# Patient Record
Sex: MALE | Race: WHITE | NOT HISPANIC OR LATINO | Employment: FULL TIME | URBAN - METROPOLITAN AREA
[De-identification: names, ages, dates, MRNs, and addresses within clinical notes are randomized per-mention and may not be internally consistent; named-entity substitution may affect disease eponyms.]

---

## 2024-11-02 ENCOUNTER — OFFICE VISIT (OUTPATIENT)
Age: 39
End: 2024-11-02
Payer: COMMERCIAL

## 2024-11-02 ENCOUNTER — APPOINTMENT (OUTPATIENT)
Age: 39
End: 2024-11-02
Payer: COMMERCIAL

## 2024-11-02 VITALS
WEIGHT: 183 LBS | TEMPERATURE: 97.3 F | RESPIRATION RATE: 18 BRPM | HEIGHT: 66 IN | OXYGEN SATURATION: 98 % | HEART RATE: 73 BPM | SYSTOLIC BLOOD PRESSURE: 130 MMHG | BODY MASS INDEX: 29.41 KG/M2 | DIASTOLIC BLOOD PRESSURE: 80 MMHG

## 2024-11-02 DIAGNOSIS — S60.352A FOREIGN BODY OF LEFT THUMB, INITIAL ENCOUNTER: Primary | ICD-10-CM

## 2024-11-02 DIAGNOSIS — S60.352A FOREIGN BODY OF LEFT THUMB, INITIAL ENCOUNTER: ICD-10-CM

## 2024-11-02 DIAGNOSIS — Z23 ENCOUNTER FOR IMMUNIZATION: ICD-10-CM

## 2024-11-02 PROCEDURE — 90715 TDAP VACCINE 7 YRS/> IM: CPT

## 2024-11-02 PROCEDURE — G0382 LEV 3 HOSP TYPE B ED VISIT: HCPCS

## 2024-11-02 PROCEDURE — 10120 INC&RMVL FB SUBQ TISS SMPL: CPT

## 2024-11-02 PROCEDURE — 90471 IMMUNIZATION ADMIN: CPT

## 2024-11-02 PROCEDURE — 73140 X-RAY EXAM OF FINGER(S): CPT

## 2024-11-02 RX ORDER — CEPHALEXIN 500 MG/1
500 CAPSULE ORAL EVERY 6 HOURS SCHEDULED
Qty: 20 CAPSULE | Refills: 0 | Status: SHIPPED | OUTPATIENT
Start: 2024-11-02 | End: 2024-11-07

## 2024-11-02 NOTE — PROGRESS NOTES
[facility]        NAME: Dougie Marc is a 39 y.o. male  : 1985    MRN: [id number]  DATE: 2024  TIME: 9:26 AM    Assessment and Plan   Foreign body of left thumb, initial encounter [S60.352A]  1. Foreign body of left thumb, initial encounter  XR thumb left first digit-min 2v    Tdap Vaccine greater than or equal to 8yo    Ambulatory Referral to Orthopedic Surgery    cephalexin (KEFLEX) 500 mg capsule      2. Encounter for immunization  Tdap Vaccine greater than or equal to 8yo          TDap last dose 2016- updated today  Patient Instructions   Preliminary reading of left thumb X-ray: no foreign body visualized however we had discussed that wood might not appear on Xrays.  Radiologist will have final reading- if that is different I will call you.    I had made an attempt to remove the foreign body that you are feeling in your thumb but was unsuccessful Recommend you follow up with Hand surgery- Call [phone] to make an appointment    If tests have been performed at [facility], our office will contact you with results if changes need to be made to the care plan discussed with you at the visit.  [facility]    Chief Complaint     Chief Complaint   Patient presents with    Foreign Body in Skin     Fell on Monday and went to brace himself with left hand, resulting in splinter in left thumb pad from non pressure treated 2x4. Thumb pad is red. Pt feels resistance when he tries to bend his thumb and believes the splinter is deep and limiting his ability to bend thumb normally. Denies drainage. No part of splinter is present but entrance hole is present.          History of Present Illness       Patient reports he fell and as he was bracing himself, piece of wood went into the padding side of his left big thumb about 5 days ago.  Since then he feels there is a foreign body that is keeping him from bending his left thumb.  There is no drainage from  "the area but there is some swelling and also what appears to be 2 puncture points.    Foreign Body in Skin  Associated symptoms include joint swelling and myalgias. Pertinent negatives include no abdominal pain, arthralgias, chest pain, chills, coughing, fever, rash, vomiting or weakness.       Review of Systems   Review of Systems   Constitutional:  Negative for chills and fever.   Eyes:  Negative for pain and visual disturbance.   Respiratory:  Negative for cough and shortness of breath.    Cardiovascular:  Negative for chest pain and palpitations.   Gastrointestinal:  Negative for abdominal pain and vomiting.   Musculoskeletal:  Positive for joint swelling and myalgias. Negative for arthralgias and back pain.   Skin:  Positive for wound. Negative for color change and rash.   Neurological:  Negative for dizziness, seizures, syncope, weakness and light-headedness.   All other systems reviewed and are negative.        Current Medications       Current Outpatient Medications:     cephalexin (KEFLEX) 500 mg capsule, Take 1 capsule (500 mg total) by mouth every 6 (six) hours for 5 days, Disp: 20 capsule, Rfl: 0    Current Allergies     Allergies as of 11/02/2024    (No Known Allergies)            The following portions of the patient's history were reviewed and updated as appropriate: allergies, current medications, past family history, past medical history, past social history, past surgical history and problem list.     History reviewed. No pertinent past medical history.    Past Surgical History:   Procedure Laterality Date    DENTAL IMPLANT         Family History   Problem Relation Age of Onset    No Known Problems Mother     Cancer Father         thyroid, prostate, bladder         Medications have been verified.        Objective   /80   Pulse 73   Temp (!) 97.3 °F (36.3 °C)   Resp 18   Ht 5' 6\" (1.676 m)   Wt 83 kg (183 lb)   SpO2 98%   BMI 29.54 kg/m²   No LMP for male patient.       Physical Exam " "    Physical Exam  Vitals and nursing note reviewed.   Constitutional:       Appearance: Normal appearance.   HENT:      Head: Normocephalic and atraumatic.   Pulmonary:      Effort: Pulmonary effort is normal.   Musculoskeletal:        Hands:    Skin:     General: Skin is warm and dry.      Capillary Refill: Capillary refill takes less than 2 seconds.   Neurological:      General: No focal deficit present.      Mental Status: He is alert and oriented to person, place, and time. Mental status is at baseline.      Sensory: No sensory deficit.      Motor: No weakness.   Psychiatric:         Mood and Affect: Mood normal.         Behavior: Behavior normal.         Thought Content: Thought content normal.     Universal Protocol:  Consent: Verbal consent obtained.  Risks and benefits: risks, benefits and alternatives were discussed  Consent given by: patient  Time out: Immediately prior to procedure a \"time out\" was called to verify the correct patient, procedure, equipment, support staff and site/side marked as required.  Patient understanding: patient states understanding of the procedure being performed  Foreign body removal    Date/Time: 11/2/2024 8:30 AM    Performed by: MARIE Polo  Authorized by: MARIE Polo  Body area: skin  General location: upper extremity  Location details: left thumb  Anesthesia: local infiltration    Anesthesia:  Local Anesthetic: lidocaine 1% without epinephrine  Anesthetic total: 0.5 mL  Patient restrained: no  Patient cooperative: yes  Localization method: probed  Removal mechanism: alligator forceps and forceps  Dressing: antibiotic ointment and dressing applied  Tendon involvement: none  Depth: subcutaneous  0 objects recovered.  Comments: A small incision was made around insertion point of the foreign body. Attempted to probe for foreign body but none that was located.                    "

## 2024-11-02 NOTE — PATIENT INSTRUCTIONS
Preliminary reading of left thumb X-ray: no foreign body visualized however we had discussed that wood might not appear on Xrays.  Radiologist will have final reading- if that is different I will call you.    I had made an attempt to remove the foreign body that you are feeling in your thumb but was unsuccessful Recommend you follow up with Hand surgery- Call [phone] to make an appointment    If tests have been performed at [facility], our office will contact you with results if changes need to be made to the care plan discussed with you at the visit.  You can review your full results on [facility].

## 2024-11-05 VITALS
SYSTOLIC BLOOD PRESSURE: 135 MMHG | BODY MASS INDEX: 29.41 KG/M2 | DIASTOLIC BLOOD PRESSURE: 70 MMHG | WEIGHT: 183 LBS | HEIGHT: 66 IN

## 2024-11-05 DIAGNOSIS — S60.352A FOREIGN BODY OF LEFT THUMB, INITIAL ENCOUNTER: ICD-10-CM

## 2024-11-05 PROCEDURE — 10120 INC&RMVL FB SUBQ TISS SMPL: CPT | Performed by: SURGERY

## 2024-11-05 PROCEDURE — 99203 OFFICE O/P NEW LOW 30 MIN: CPT | Performed by: SURGERY

## 2024-11-05 RX ORDER — LIDOCAINE HYDROCHLORIDE 10 MG/ML
4 INJECTION, SOLUTION INFILTRATION; PERINEURAL
Status: COMPLETED | OUTPATIENT
Start: 2024-11-05 | End: 2024-11-05

## 2024-11-05 RX ADMIN — LIDOCAINE HYDROCHLORIDE 4 ML: 10 INJECTION, SOLUTION INFILTRATION; PERINEURAL at 08:15

## 2024-11-05 NOTE — PROGRESS NOTES
ORTHOPAEDIC HAND, WRIST, AND ELBOW OFFICE  VISIT       ASSESSMENT/PLAN:      39 y.o. year old male who presents with left thumb foreign body    Physical exam was performed  We discussed exploration of the thumb may not remove the object. Patient elected to attempt  Foreign body removal performed. 8 mm sliver of wood like material removed.  See procedure notes below.  May remove dressing tomorrow and wash normally.  Band aids as needed for the next few days after dressing removal.    Cont with antibiotics as prescribed  NSAID's as needed  Activities as tolerated    The patient verbalized understanding of exam findings and treatment plan. We engaged in the shared decision-making process and treatment options were discussed at length with the patient. Surgical and conservative management discussed today along with risks and benefits.    Diagnoses and all orders for this visit:    Foreign body of left thumb, initial encounter  -     Ambulatory Referral to Orthopedic Surgery  -     Hand/upper extremity injection  -     Foreign body removal    Other orders  -     Cancel: Incision and drain        Follow Up:  Return if symptoms worsen or fail to improve.        ____________________________________________________________________________________________________________________________________________      CHIEF COMPLAINT:  Chief Complaint   Patient presents with    Left Hand - Pain       SUBJECTIVE:  Dougie Marc is a 39 y.o. year old  male who presents for evaluation     A week ago patient states that he tripped and fell and went to grab a 2x4 and a splint was lodged into his left thumb. He knew a splinter was in the thumb volar pad but there was nothing to remove. He has been wrapping the thumb to prevent the thumb from moving as this is painful  He went to the  were they attempted to remove the object but none was found  [facility] note was reviewed  He still has pain with flexion of the IP joint and there is tenderness at  "the IP joint so he is not bending the thumb. He can feel the objected poking the skin when he tries to bend the thumb  He would like to have the object removed  He is currently taking his antibiotics    I have personally reviewed all the relevant PMH, PSH, SH, FH, Medications and allergies      PAST MEDICAL HISTORY:  No past medical history on file.    PAST SURGICAL HISTORY:  Past Surgical History:   Procedure Laterality Date    DENTAL IMPLANT         FAMILY HISTORY:  Family History   Problem Relation Age of Onset    No Known Problems Mother     Cancer Father         thyroid, prostate, bladder       SOCIAL HISTORY:  Social History     Tobacco Use    Smoking status: Never    Smokeless tobacco: Never   Vaping Use    Vaping status: Never Used   Substance Use Topics    Alcohol use: Not Currently    Drug use: Never       MEDICATIONS:    Current Outpatient Medications:     cephalexin (KEFLEX) 500 mg capsule, Take 1 capsule (500 mg total) by mouth every 6 (six) hours for 5 days, Disp: 20 capsule, Rfl: 0    ALLERGIES:  No Known Allergies        REVIEW OF SYSTEMS:  Review of Systems   Constitutional:  Negative for chills and fever.   HENT:  Negative for ear pain and sore throat.    Eyes:  Negative for pain and visual disturbance.   Respiratory:  Negative for cough and shortness of breath.    Cardiovascular:  Negative for chest pain and palpitations.   Gastrointestinal:  Negative for abdominal pain and vomiting.   Genitourinary:  Negative for dysuria and hematuria.   Musculoskeletal:  Negative for arthralgias and back pain.   Skin:  Negative for color change and rash.   Neurological:  Negative for seizures and syncope.   All other systems reviewed and are negative.      VITALS:  Vitals:    11/05/24 0803   BP: 135/70       LABS:  HgA1c: No results found for: \"HGBA1C\"  BMP:   Lab Results   Component Value Date    CALCIUM 9.0 07/11/2020    K 4.5 07/11/2020    CO2 26 07/11/2020     07/11/2020    BUN 16 07/11/2020    " CREATININE 1.03 07/11/2020       _____________________________________________________  PHYSICAL EXAMINATION:  General: well developed and well nourished, alert, oriented times 3, and appears comfortable  Psychiatric: Normal  HEENT: Normocephalic, Atraumatic Trachea Midline, No torticollis  Pulmonary: No audible wheezing or respiratory distress   Abdomen/GI: Non tender, non distended   Cardiovascular: No pitting edema, 2+ radial pulse   Skin: No masses, erythema, lacerations, fluctation, ulcerations  Neurovascular: Sensation Intact to the Median, Ulnar, Radial Nerve, Motor Intact to the Median, Ulnar, Radial Nerve, and Pulses Intact  Musculoskeletal: Normal, except as noted in detailed exam and in HPI.      MUSCULOSKELETAL EXAMINATION:  Right hand:  SILT  Composite fist    Left hand:  SILT  Composite fist NT    Healing puncture wound volar thumb  Healing incision volar thumb with peeling skin  Mild swelling  No erythema  No signs of infection    ___________________________________________________  STUDIES REVIEWED:  I have personally reviewed AP lateral and oblique radiographs of Left thumb   which demonstrate      FINDINGS:     No acute fracture or dislocation.     No significant degenerative changes.     No lytic or blastic osseous lesion.     Unremarkable soft tissues. No soft tissue gas. No foreign bodies are visible. If there is high clinical concern for a non-radiopaque foreign body such as a wooden splinter, a targeted ultrasound of the area may provide further diagnostic information..     IMPRESSION:     No abnormality visible. If there is high clinical concern for a foreign body which is not visible on x-ray, an ultrasound of the area might be helpful.          PROCEDURES PERFORMED:  Hand/upper extremity injection  Universal Protocol:  Consent: Verbal consent obtained.  Risks and benefits: risks, benefits and alternatives were discussed  Consent given by: patient  Timeout called at: 11/5/2024 8:28  AM.  Patient understanding: patient states understanding of the procedure being performed  Patient identity confirmed: verbally with patient  Procedure Details  Needle size: 22 G  Ultrasound guidance: no  Approach: dorsal  Medications administered: 4 mL lidocaine 1 %  Patient tolerance: patient tolerated the procedure well with no immediate complications       Universal Protocol:  Consent: Verbal consent obtained.  Consent given by: patient  Timeout called at: 11/5/2024 8:33 AM.  Patient understanding: patient states understanding of the procedure being performed  Patient identity confirmed: verbally with patient  Foreign body removal    Date/Time: 11/5/2024 8:15 AM    Performed by: Curt White MD  Authorized by: Curt White MD  Intake: Left volar thumb.  Anesthesia: local infiltration    Anesthesia:  Local Anesthetic: lidocaine 1% without epinephrine  Anesthetic total: 10 mL    Sedation:  Patient sedated: no  Patient restrained: no  Patient cooperative: yes  Complexity: simple  1 objects recovered.  Objects recovered: wood  Post-procedure assessment: foreign body removed  Patient tolerance: patient tolerated the procedure well with no immediate complications          _____________________________________________________      Scribe Attestation      I,:  Dylan Nelson am acting as a scribe while in the presence of the attending physician.:       I,:  Curt White MD personally performed the services described in this documentation    as scribed in my presence.:

## 2024-12-06 ENCOUNTER — OFFICE VISIT (OUTPATIENT)
Age: 39
End: 2024-12-06
Payer: COMMERCIAL

## 2024-12-06 VITALS
WEIGHT: 183 LBS | SYSTOLIC BLOOD PRESSURE: 124 MMHG | DIASTOLIC BLOOD PRESSURE: 60 MMHG | HEART RATE: 73 BPM | TEMPERATURE: 97 F | OXYGEN SATURATION: 99 % | RESPIRATION RATE: 18 BRPM | HEIGHT: 66 IN | BODY MASS INDEX: 29.41 KG/M2

## 2024-12-06 DIAGNOSIS — J06.9 VIRAL URI: Primary | ICD-10-CM

## 2024-12-06 LAB — S PYO AG THROAT QL: NEGATIVE

## 2024-12-06 PROCEDURE — G0382 LEV 3 HOSP TYPE B ED VISIT: HCPCS | Performed by: EMERGENCY MEDICINE

## 2024-12-06 PROCEDURE — 87880 STREP A ASSAY W/OPTIC: CPT | Performed by: EMERGENCY MEDICINE

## 2024-12-06 RX ORDER — PREDNISONE 10 MG/1
TABLET ORAL
Qty: 21 TABLET | Refills: 0 | Status: SHIPPED | OUTPATIENT
Start: 2024-12-06

## 2024-12-06 NOTE — PATIENT INSTRUCTIONS
You have been diagnosed with a Viral Upper Respiratory infection and your symptoms should resolve over the next 7 to 10 days with the treatments recommended today.  If they do not, it is possible that you have developed a bacterial infection and you should return. If you were to take an antibiotic while you are still in the viral stage, you will not get better any faster, but could kill off many of the good germs in your body as well as make the germs in you resistant to the antibiotic.  Take an expectorant - guaifenesin should be the only ingredient - during the day, and a cough suppressant (ex. Robitussin DM or Tessalon) if needed at night only.   Take Zinc 25 mg every 12 hours for the next week (the dose is important so do not just take a multivitamin with zinc or an over-the-counter cold med with zinc such as Airborne or Zicam, as that will not give you the sufficient dose).    You should also take Vitamin D3 5000 i.u.s per day for the next 1 week, and Vitamin-C 1 g twice daily (and again dosages are important, do not think you are getting enough vitamin C just by drinking extra orange juice).  You may use Flonase as discussed.  You may also take a decongestant like Sudafed, unless you have hypertension or cardiac disease. Avoid nasal sprays like Afrin or other vasoconstrictors.  If you are diabetic you should adhere strictly to your diabetic diet and monitor blood sugar closely while on prednisone and you should discontinue the prednisone if blood sugar becomes significantly elevated.  Avoid nonsteroidal anti-inflammatories like Advil or Aleve while on prednisone.   Although bothersome, mucous is not necessarily a bad thing. Production of mucous is the body's way of trying to capture and flush irritants from mucosal surfaces. Yellow or green mucous does not necessarily mean you have a bacterial infection. Mucous will become more discolored over time, especially first thing in the morning, as your body's  immune system floods the mucosal surfaces with white bloods cells to try and help fight infection. This white blood cell debride can also cause mucous to be discolored. Again, using nasal saline spray frequently may help soothe and keep mucous flowing out versus getting dried, thickened and / or stuck leading to more sinus pain and pressure.      If you have a cough, please realize that a cough is not necessarily a bad thing. It is a part of your body's protection mechanism to help keep your airways clear. Phlegm may be more discolored in the morning. Please note that discolored phlegm does not necessarily mean a bacterial infection.      Upper Respiratory Infection   AMBULATORY CARE:   An upper respiratory infection  is also called a cold. Your nose, throat, ears, and sinuses may be affected. You are more likely to get a cold in the winter. Your risk of getting a cold may be increased if you smoke cigarettes or have allergies, such as hay fever.  What causes a cold?  A cold is caused by a virus. Many viruses can cause a cold, and each is contagious. This means the virus can be easily spread to another person when the sick person coughs or sneezes. The virus can also be spread if you touch an object the virus is on and then touch your eyes, mouth, or nose.  Cold symptoms  are usually worst for the first 3 to 5 days. You may have any of the following:  Runny or stuffy nose    Sneezing and coughing    Sore throat or hoarseness    Red, watery, and sore eyes    Fatigue (you feel more tired than usual)    Chills and fever    Headache, body aches, or sore muscles    Call your local emergency number (911 in the [place]) if:   You have chest pain or trouble breathing.      Seek care immediately if:   You have a fever over 102ºF (39ºC).      Call your doctor if:   You have a low fever.    Your sore throat gets worse or you see white or yellow spots in your throat.    Your symptoms get worse after 3 to 5 days or are not better in  14 days.    You have a rash anywhere on your skin.    You have large, tender lumps in your neck.    You have thick, green, or yellow drainage from your nose.    You cough up thick yellow, green, or bloody mucus.    You have a bad earache.    You have questions or concerns about your condition or care.    Treatment:  Colds are caused by viruses and do not get better with antibiotics. Most people get better in 7 to 14 days. You may continue to cough for 2 to 3 weeks. The following may help decrease your symptoms:  Decongestants  help reduce nasal congestion and help you breathe more easily. If you take decongestant pills, they may make you feel restless or not able to sleep. Do not use decongestant sprays for more than a few days.    Cough suppressants  help reduce coughing. Ask your healthcare provider which type of cough medicine is best for you.     NSAIDs , such as ibuprofen, help decrease swelling, pain, and fever. NSAIDs can cause stomach bleeding or kidney problems in certain people. If you take blood thinner medicine, always ask your healthcare provider if NSAIDs are safe for you. Always read the medicine label and follow directions.    Acetaminophen  decreases pain and fever. It is available without a doctor's order. Ask how much to take and how often to take it. Follow directions. Read the labels of all other medicines you are using to see if they also contain acetaminophen, or ask your doctor or pharmacist. Acetaminophen can cause liver damage if not taken correctly. Do not use more than 4 grams (4,000 milligrams) total of acetaminophen in one day.    Manage a cold:   Rest as much as possible.  Slowly start to do more each day.    Drink more liquids as directed.  Liquids will help thin and loosen mucus so you can cough it up. Liquids will also help prevent dehydration. Liquids that help prevent dehydration include water, fruit juice, and broth. Do not drink liquids that contain caffeine. Caffeine can  increase your risk for dehydration. Ask your healthcare provider how much liquid to drink each day.    Soothe a sore throat.  Gargle with warm salt water. Make salt water by dissolving ¼ teaspoon salt in 1 cup warm water. You may also suck on hard candy or throat lozenges. You may use a sore throat spray.    Use a humidifier or vaporizer.  Use a cool mist humidifier or a vaporizer to increase air moisture in your home. This may make it easier for you to breathe and help decrease your cough.    Use saline nasal drops as directed.  These help relieve congestion.    Apply petroleum-based jelly around the outside of your nostrils.  This can decrease irritation from blowing your nose.    Do not smoke.  Nicotine and other chemicals in cigarettes and cigars can make your symptoms worse. They can also cause infections such as bronchitis or pneumonia. Ask your healthcare provider for information if you currently smoke and need help to quit. E-cigarettes or smokeless tobacco still contain nicotine. Talk to your healthcare provider before you use these products.    Prevent a cold:   Wash your hands often.  Use soap and water every time you wash your hands. Rub your soapy hands together, lacing your fingers. Use the fingers of one hand to scrub under the nails of the other hand. Wash for at least 20 seconds. Rinse with warm, running water for several seconds. Then dry your hands. Use germ-killing gel if soap and water are not available. Do not touch your eyes or mouth without washing your hands first.     Cover a sneeze or cough.  Use a tissue that covers your mouth and nose. Put the used tissue in the trash right away. Use the bend of your arm if a tissue is not available. Wash your hands well with soap and water or use a hand . Do not stand close to anyone who is sneezing or coughing.    Try to stay away from others while you are sick.  This is especially important during the first 2 to 3 days when the virus is more  easily spread. Wait until a fever, cough, or other symptoms are gone before you return to work or other regular activities.    Do not share items while you are sick.  This includes food, drinks, eating utensils, and dishes.    Follow up with your doctor as directed:  Write down your questions so you remember to ask them during your visits.  © Copyright [employer] 2022 Information is for End User's use only and may not be sold, redistributed or otherwise used for commercial purposes. All illustrations and images included in CareNotes® are the copyrighted property of [employer]. or [employer]  The above information is an  only. It is not intended as medical advice for individual conditions or treatments. Talk to your doctor, nurse or pharmacist before following any medical regimen to see if it is safe and effective for you.

## 2024-12-06 NOTE — PROGRESS NOTES
[facility]        NAME: Dougie Marc is a 39 y.o. male  : 1985    MRN: [id number]  DATE: 2024  TIME: 9:16 AM    Assessment and Plan   Viral URI [J06.9]  1. Viral URI  POCT rapid strepA    predniSONE 10 mg tablet            Patient Instructions     Patient Instructions     You have been diagnosed with a Viral Upper Respiratory infection and your symptoms should resolve over the next 7 to 10 days with the treatments recommended today.  If they do not, it is possible that you have developed a bacterial infection and you should return. If you were to take an antibiotic while you are still in the viral stage, you will not get better any faster, but could kill off many of the good germs in your body as well as make the germs in you resistant to the antibiotic.  Take an expectorant - guaifenesin should be the only ingredient - during the day, and a cough suppressant (ex. Robitussin DM or Tessalon) if needed at night only.   Take Zinc 25 mg every 12 hours for the next week (the dose is important so do not just take a multivitamin with zinc or an over-the-counter cold med with zinc such as Airborne or Zicam, as that will not give you the sufficient dose).    You should also take Vitamin D3 5000 i.u.s per day for the next 1 week, and Vitamin-C 1 g twice daily (and again dosages are important, do not think you are getting enough vitamin C just by drinking extra orange juice).  You may use Flonase as discussed.  You may also take a decongestant like Sudafed, unless you have hypertension or cardiac disease. Avoid nasal sprays like Afrin or other vasoconstrictors.  If you are diabetic you should adhere strictly to your diabetic diet and monitor blood sugar closely while on prednisone and you should discontinue the prednisone if blood sugar becomes significantly elevated.  Avoid nonsteroidal anti-inflammatories like Advil or Aleve while on prednisone.   Although bothersome, mucous is not necessarily  a bad thing. Production of mucous is the body's way of trying to capture and flush irritants from mucosal surfaces. Yellow or green mucous does not necessarily mean you have a bacterial infection. Mucous will become more discolored over time, especially first thing in the morning, as your body's immune system floods the mucosal surfaces with white bloods cells to try and help fight infection. This white blood cell debride can also cause mucous to be discolored. Again, using nasal saline spray frequently may help soothe and keep mucous flowing out versus getting dried, thickened and / or stuck leading to more sinus pain and pressure.      If you have a cough, please realize that a cough is not necessarily a bad thing. It is a part of your body's protection mechanism to help keep your airways clear. Phlegm may be more discolored in the morning. Please note that discolored phlegm does not necessarily mean a bacterial infection.      Upper Respiratory Infection   AMBULATORY CARE:   An upper respiratory infection  is also called a cold. Your nose, throat, ears, and sinuses may be affected. You are more likely to get a cold in the winter. Your risk of getting a cold may be increased if you smoke cigarettes or have allergies, such as hay fever.  What causes a cold?  A cold is caused by a virus. Many viruses can cause a cold, and each is contagious. This means the virus can be easily spread to another person when the sick person coughs or sneezes. The virus can also be spread if you touch an object the virus is on and then touch your eyes, mouth, or nose.  Cold symptoms  are usually worst for the first 3 to 5 days. You may have any of the following:  Runny or stuffy nose    Sneezing and coughing    Sore throat or hoarseness    Red, watery, and sore eyes    Fatigue (you feel more tired than usual)    Chills and fever    Headache, body aches, or sore muscles    Call your local emergency number (911 in the [place]) if:   You have  chest pain or trouble breathing.      Seek care immediately if:   You have a fever over 102ºF (39ºC).      Call your doctor if:   You have a low fever.    Your sore throat gets worse or you see white or yellow spots in your throat.    Your symptoms get worse after 3 to 5 days or are not better in 14 days.    You have a rash anywhere on your skin.    You have large, tender lumps in your neck.    You have thick, green, or yellow drainage from your nose.    You cough up thick yellow, green, or bloody mucus.    You have a bad earache.    You have questions or concerns about your condition or care.    Treatment:  Colds are caused by viruses and do not get better with antibiotics. Most people get better in 7 to 14 days. You may continue to cough for 2 to 3 weeks. The following may help decrease your symptoms:  Decongestants  help reduce nasal congestion and help you breathe more easily. If you take decongestant pills, they may make you feel restless or not able to sleep. Do not use decongestant sprays for more than a few days.    Cough suppressants  help reduce coughing. Ask your healthcare provider which type of cough medicine is best for you.     NSAIDs , such as ibuprofen, help decrease swelling, pain, and fever. NSAIDs can cause stomach bleeding or kidney problems in certain people. If you take blood thinner medicine, always ask your healthcare provider if NSAIDs are safe for you. Always read the medicine label and follow directions.    Acetaminophen  decreases pain and fever. It is available without a doctor's order. Ask how much to take and how often to take it. Follow directions. Read the labels of all other medicines you are using to see if they also contain acetaminophen, or ask your doctor or pharmacist. Acetaminophen can cause liver damage if not taken correctly. Do not use more than 4 grams (4,000 milligrams) total of acetaminophen in one day.    Manage a cold:   Rest as much as possible.  Slowly start to do  more each day.    Drink more liquids as directed.  Liquids will help thin and loosen mucus so you can cough it up. Liquids will also help prevent dehydration. Liquids that help prevent dehydration include water, fruit juice, and broth. Do not drink liquids that contain caffeine. Caffeine can increase your risk for dehydration. Ask your healthcare provider how much liquid to drink each day.    Soothe a sore throat.  Gargle with warm salt water. Make salt water by dissolving ¼ teaspoon salt in 1 cup warm water. You may also suck on hard candy or throat lozenges. You may use a sore throat spray.    Use a humidifier or vaporizer.  Use a cool mist humidifier or a vaporizer to increase air moisture in your home. This may make it easier for you to breathe and help decrease your cough.    Use saline nasal drops as directed.  These help relieve congestion.    Apply petroleum-based jelly around the outside of your nostrils.  This can decrease irritation from blowing your nose.    Do not smoke.  Nicotine and other chemicals in cigarettes and cigars can make your symptoms worse. They can also cause infections such as bronchitis or pneumonia. Ask your healthcare provider for information if you currently smoke and need help to quit. E-cigarettes or smokeless tobacco still contain nicotine. Talk to your healthcare provider before you use these products.    Prevent a cold:   Wash your hands often.  Use soap and water every time you wash your hands. Rub your soapy hands together, lacing your fingers. Use the fingers of one hand to scrub under the nails of the other hand. Wash for at least 20 seconds. Rinse with warm, running water for several seconds. Then dry your hands. Use germ-killing gel if soap and water are not available. Do not touch your eyes or mouth without washing your hands first.     Cover a sneeze or cough.  Use a tissue that covers your mouth and nose. Put the used tissue in the trash right away. Use the bend of your  arm if a tissue is not available. Wash your hands well with soap and water or use a hand . Do not stand close to anyone who is sneezing or coughing.    Try to stay away from others while you are sick.  This is especially important during the first 2 to 3 days when the virus is more easily spread. Wait until a fever, cough, or other symptoms are gone before you return to work or other regular activities.    Do not share items while you are sick.  This includes food, drinks, eating utensils, and dishes.    Follow up with your doctor as directed:  Write down your questions so you remember to ask them during your visits.  © Copyright [employer] 2022 Information is for End User's use only and may not be sold, redistributed or otherwise used for commercial purposes. All illustrations and images included in CareNotes® are the copyrighted property of [employer]. or [employer]  The above information is an  only. It is not intended as medical advice for individual conditions or treatments. Talk to your doctor, nurse or pharmacist before following any medical regimen to see if it is safe and effective for you.          Follow up with PCP in 3-5 days.  Proceed to  ER if symptoms worsen.    Chief Complaint     Chief Complaint   Patient presents with    Sore Throat     C/o sore throat and slight cough x 5days         History of Present Illness       Complains of sore throat and congestion for the past 5 days    Sore Throat   Associated symptoms include congestion and coughing. Pertinent negatives include no diarrhea, ear discharge, headaches, shortness of breath or vomiting.       Review of Systems   Review of Systems   Constitutional:  Negative for chills and fever.   HENT:  Positive for congestion, rhinorrhea, sinus pressure and sore throat. Negative for ear discharge and hearing loss.    Respiratory:  Positive for cough. Negative for chest tightness, shortness of breath and wheezing.   "  Gastrointestinal:  Negative for diarrhea and vomiting.   Musculoskeletal:  Negative for neck stiffness.   Skin:  Negative for pallor.   Neurological:  Negative for headaches.         Current Medications       Current Outpatient Medications:     predniSONE 10 mg tablet, Take once daily all day's pills on this schedule  6- 5- 4- 3- 2- 1, Disp: 21 tablet, Rfl: 0    Current Allergies     Allergies as of 12/06/2024    (No Known Allergies)            The following portions of the patient's history were reviewed and updated as appropriate: allergies, current medications, past family history, past medical history, past social history, past surgical history and problem list.     History reviewed. No pertinent past medical history.    Past Surgical History:   Procedure Laterality Date    DENTAL IMPLANT         Family History   Problem Relation Age of Onset    No Known Problems Mother     Cancer Father         thyroid, prostate, bladder         Medications have been verified.        Objective   /60 (BP Location: Right arm, Patient Position: Sitting)   Pulse 73   Temp (!) 97 °F (36.1 °C)   Resp 18   Ht 5' 6\" (1.676 m)   Wt 83 kg (183 lb)   SpO2 99%   BMI 29.54 kg/m²        Physical Exam     Physical Exam  Vitals and nursing note reviewed.   Constitutional:       General: He is not in acute distress.     Appearance: Normal appearance. He is well-developed. He is not ill-appearing or diaphoretic.   HENT:      Head: Normocephalic and atraumatic.      Right Ear: Tympanic membrane, ear canal and external ear normal.      Left Ear: Tympanic membrane, ear canal and external ear normal.      Nose: Mucosal edema and congestion present. No rhinorrhea.      Mouth/Throat:      Pharynx: Posterior oropharyngeal erythema present.      Tonsils: No tonsillar exudate or tonsillar abscesses. 1+ on the right. 1+ on the left.   Eyes:      Conjunctiva/sclera: Conjunctivae normal.      Pupils: Pupils are equal, round, and reactive to " light.   Cardiovascular:      Rate and Rhythm: Normal rate and regular rhythm.      Heart sounds: Normal heart sounds.   Pulmonary:      Effort: Pulmonary effort is normal. No respiratory distress.      Breath sounds: Normal breath sounds. No wheezing, rhonchi or rales.   Musculoskeletal:      Cervical back: Neck supple.   Lymphadenopathy:      Cervical: No cervical adenopathy.   Skin:     General: Skin is warm and dry.      Coloration: Skin is not pale.      Findings: No rash.   Neurological:      Mental Status: He is alert and oriented to person, place, and time.   Psychiatric:         Mood and Affect: Mood normal.         Behavior: Behavior normal.         Thought Content: Thought content normal.         Judgment: Judgment normal.

## 2025-01-22 ENCOUNTER — OFFICE VISIT (OUTPATIENT)
Age: 40
End: 2025-01-22
Payer: COMMERCIAL

## 2025-01-22 VITALS
BODY MASS INDEX: 29.73 KG/M2 | HEART RATE: 78 BPM | WEIGHT: 185 LBS | OXYGEN SATURATION: 98 % | SYSTOLIC BLOOD PRESSURE: 136 MMHG | DIASTOLIC BLOOD PRESSURE: 88 MMHG | RESPIRATION RATE: 18 BRPM | TEMPERATURE: 96.3 F | HEIGHT: 66 IN

## 2025-01-22 DIAGNOSIS — F43.21 GRIEF REACTION: Primary | ICD-10-CM

## 2025-01-22 PROCEDURE — G0382 LEV 3 HOSP TYPE B ED VISIT: HCPCS | Performed by: EMERGENCY MEDICINE

## 2025-01-22 RX ORDER — ALPRAZOLAM 0.5 MG
0.5 TABLET ORAL 2 TIMES DAILY PRN
Qty: 10 TABLET | Refills: 0 | Status: SHIPPED | OUTPATIENT
Start: 2025-01-22

## 2025-01-22 NOTE — PATIENT INSTRUCTIONS
Patient Education     Dealing With Death, Adult   About this topic   Death happens to all living things at some point in time. Grief is a normal response to the loss of a family member, friend, or pet. It does not matter if the death was a result of a long illness or a sudden accident. The process of grieving may be very painful. It can affect your feelings and your view of life around you. You may feel the physical result of grief too. It may take a long time to work through your grief, before you feel like you have adjusted to normal life again. Losing someone is a sad thing and sadness is a big part of grief. It may seem hard to just go on with life having this grief, but there are healthy ways to cope with it.  General   People often talk about moving on after the loss of a loved one. Moving on does not mean to forget the loved one who . Some people feel to move shows a lack of love or respect for the one who . In fact, it just shows that the grief has run its course. Grief is a time-limited normal human response to loss. It will take time to heal. With time and support, you will find a new way to live without your loved one in your life.  There are healthy ways to cope with the sadness and grief.  Deal with your feelings:   There is no right or wrong way to grieve. It is only important that you do it without hurting yourself or others.  Know that it's okay to cry. This is a normal way to let go of strong feelings. Cry alone, cry with a counselor, cry with family or friends.  Be honest with yourself and admit your feelings. Try to accept that the loss happened. Recognize this is a hard time. Try not to pretend that all is well. This will let others to know their support is needed.  Talk about how you feel and what you think. Talking about what happened is a healthy way of trying to make sense of your loss. It will help you work toward accepting your life without your loved one.  Get support from others:    Remember, there are still other family members and friends who love you. Don't forget those who are living.   Get support from other family members and friends who love you. Tell people what you need or what may be helpful. Such as, if being in the house alone is hard, ask someone to spend the night or weekend. If cooking or eating alone is hard, ask for help with meals or go out to eat with friends. If going through clothing or personal items is too painful, ask a close friend to help.   Draw comfort from your ashley. You may find praying, meditating, or talking to a Alevism adviser helpful.  Talk to a therapist or grief counselor. This person can help you deal with the strong feelings that go along with grief and loss. Often, friends and family may want to help. They may not have the training to know how in a healthy way.  Join a support group. You will be able to share feelings and listen to others who have gone through their own loss.  Activities may be helpful:   Reading books on grief and loss may be helpful.  Do things that you enjoy and are fun. Some people enjoy painting or drawing. Others are drawn to singing, playing a musical instrument, or dancing. Working with your hands, gardening, or doing crafts may appeal to some people. It often feels good to do something to express strong feelings.  Spend time working with others. Join Judaism or community activities.  Keep a diary or scrapbook. This is one more way to express strong thoughts and feelings as you deal with grief. It also gives a way to look back as time goes on. You can see how the pain eases and life starts to move on once again.  Create a memorial. Having some lasting keepsake of a loved one may bring comfort to you. Plant a tree, prosper bush, or flowers. Make a garden spot or get a tattoo. Some families create scholarships or make donations to a favorite chari in a loved one's name.  Plan ahead for hard times:   Make plans on how to cope with  holidays or anniversaries. These can bring back memories and feelings. Be ready and expect these feelings. Talk to your family and friends as a way to give honor to your loved one.  Don't be afraid to ask for extra support during these times.  Avoid self-destructive ways of dealing with your grief.   Do not leave or avoid friends and family who love you.  Don't use alcohol or illegal or prescription drugs to numb strong feelings.  Avoid overeating or not eating enough.  Don't madrid or over spend to try and fill the emptiness of loss and grief.  Put off major life decisions for a while. If a major decision has to be made, talk with a close friend or counselor before making it. Get other views before deciding.  Don't take anger out on others, physically, emotionally, or verbally.  When do I need to call the doctor?   You have made any attempt to hurt yourself or if you have been doing very risky things with the hope of dying by accident  You feel life is not worth living or you have thoughts of harming yourself or someone else  You can’t sleep, eat, or think clearly  You long to die to be with your loved one  You blame yourself for the loss  You keep yourself away from other people  You show signs of not taking care of yourself. These can be not:   Eating  Showering  Talking to others or getting out of bed  Taking needed drugs  Going to appointments, work, or school  You can't trust other people or feel fearful of others  Your grief does not go away or seems to be getting worse  Last Reviewed Date   2021-07-09  Consumer Information Use and Disclaimer   This generalized information is a limited summary of diagnosis, treatment, and/or medication information. It is not meant to be comprehensive and should be used as a tool to help the user understand and/or assess potential diagnostic and treatment options. It does NOT include all information about conditions, treatments, medications, side effects, or risks that may  apply to a specific patient. It is not intended to be medical advice or a substitute for the medical advice, diagnosis, or treatment of a health care provider based on the health care provider's examination and assessment of a patient’s specific and unique circumstances. Patients must speak with a health care provider for complete information about their health, medical questions, and treatment options, including any risks or benefits regarding use of medications. This information does not endorse any treatments or medications as safe, effective, or approved for treating a specific patient. [employer] and its affiliates disclaim any warranty or liability relating to this information or the use thereof. The use of this information is governed by the Terms of Use, available at https://www.[employer].com/en/know/clinical-effectiveness-terms   Copyright   Copyright © 2024 [employer] and its affiliates and/or licensors. All rights reserved.

## 2025-01-22 NOTE — PROGRESS NOTES
[facility]        NAME: Dougie Marc is a 39 y.o. male  : 1985    MRN: [id number]  DATE: 2025  TIME: 10:19 AM    Assessment and Plan   Grief reaction [F43.21]  1. Grief reaction  ALPRAZolam (XANAX) 0.5 mg tablet            Patient Instructions     Patient Instructions   Patient Education     Dealing With Death, Adult   About this topic   Death happens to all living things at some point in time. Grief is a normal response to the loss of a family member, friend, or pet. It does not matter if the death was a result of a long illness or a sudden accident. The process of grieving may be very painful. It can affect your feelings and your view of life around you. You may feel the physical result of grief too. It may take a long time to work through your grief, before you feel like you have adjusted to normal life again. Losing someone is a sad thing and sadness is a big part of grief. It may seem hard to just go on with life having this grief, but there are healthy ways to cope with it.  General   People often talk about moving on after the loss of a loved one. Moving on does not mean to forget the loved one who . Some people feel to move shows a lack of love or respect for the one who . In fact, it just shows that the grief has run its course. Grief is a time-limited normal human response to loss. It will take time to heal. With time and support, you will find a new way to live without your loved one in your life.  There are healthy ways to cope with the sadness and grief.  Deal with your feelings:   There is no right or wrong way to grieve. It is only important that you do it without hurting yourself or others.  Know that it's okay to cry. This is a normal way to let go of strong feelings. Cry alone, cry with a counselor, cry with family or friends.  Be honest with yourself and admit your feelings. Try to accept that the loss happened. Recognize this is a hard time. Try not to  pretend that all is well. This will let others to know their support is needed.  Talk about how you feel and what you think. Talking about what happened is a healthy way of trying to make sense of your loss. It will help you work toward accepting your life without your loved one.  Get support from others:   Remember, there are still other family members and friends who love you. Don't forget those who are living.   Get support from other family members and friends who love you. Tell people what you need or what may be helpful. Such as, if being in the house alone is hard, ask someone to spend the night or weekend. If cooking or eating alone is hard, ask for help with meals or go out to eat with friends. If going through clothing or personal items is too painful, ask a close friend to help.   Draw comfort from your ashley. You may find praying, meditating, or talking to a Sikh adviser helpful.  Talk to a therapist or grief counselor. This person can help you deal with the strong feelings that go along with grief and loss. Often, friends and family may want to help. They may not have the training to know how in a healthy way.  Join a support group. You will be able to share feelings and listen to others who have gone through their own loss.  Activities may be helpful:   Reading books on grief and loss may be helpful.  Do things that you enjoy and are fun. Some people enjoy painting or drawing. Others are drawn to singing, playing a musical instrument, or dancing. Working with your hands, gardening, or doing crafts may appeal to some people. It often feels good to do something to express strong feelings.  Spend time working with others. Join Islam or community activities.  Keep a diary or scrapbook. This is one more way to express strong thoughts and feelings as you deal with grief. It also gives a way to look back as time goes on. You can see how the pain eases and life starts to move on once again.  Create a  memorial. Having some lasting keepsake of a loved one may bring comfort to you. Plant a tree, prosper bush, or flowers. Make a garden spot or get a tattoo. Some families create scholarships or make donations to a favorite chari in a loved one's name.  Plan ahead for hard times:   Make plans on how to cope with holidays or anniversaries. These can bring back memories and feelings. Be ready and expect these feelings. Talk to your family and friends as a way to give honor to your loved one.  Don't be afraid to ask for extra support during these times.  Avoid self-destructive ways of dealing with your grief.   Do not leave or avoid friends and family who love you.  Don't use alcohol or illegal or prescription drugs to numb strong feelings.  Avoid overeating or not eating enough.  Don't madrid or over spend to try and fill the emptiness of loss and grief.  Put off major life decisions for a while. If a major decision has to be made, talk with a close friend or counselor before making it. Get other views before deciding.  Don't take anger out on others, physically, emotionally, or verbally.  When do I need to call the doctor?   You have made any attempt to hurt yourself or if you have been doing very risky things with the hope of dying by accident  You feel life is not worth living or you have thoughts of harming yourself or someone else  You can’t sleep, eat, or think clearly  You long to die to be with your loved one  You blame yourself for the loss  You keep yourself away from other people  You show signs of not taking care of yourself. These can be not:   Eating  Showering  Talking to others or getting out of bed  Taking needed drugs  Going to appointments, work, or school  You can't trust other people or feel fearful of others  Your grief does not go away or seems to be getting worse  Last Reviewed Date   2021-07-09  Consumer Information Use and Disclaimer   This generalized information is a limited summary of  diagnosis, treatment, and/or medication information. It is not meant to be comprehensive and should be used as a tool to help the user understand and/or assess potential diagnostic and treatment options. It does NOT include all information about conditions, treatments, medications, side effects, or risks that may apply to a specific patient. It is not intended to be medical advice or a substitute for the medical advice, diagnosis, or treatment of a health care provider based on the health care provider's examination and assessment of a patient’s specific and unique circumstances. Patients must speak with a health care provider for complete information about their health, medical questions, and treatment options, including any risks or benefits regarding use of medications. This information does not endorse any treatments or medications as safe, effective, or approved for treating a specific patient. [employer] and its affiliates disclaim any warranty or liability relating to this information or the use thereof. The use of this information is governed by the Terms of Use, available at https://www.[employer].com/en/know/clinical-effectiveness-terms   Copyright   Copyright © 2024 [employer] and its affiliates and/or licensors. All rights reserved.      Follow up with PCP in 3-5 days.  Proceed to  ER if symptoms worsen.    Chief Complaint     Chief Complaint   Patient presents with    Anxiety     Pt had death in family this morning, now feeling extremely tearful. Denies feelings of self harm.          History of Present Illness       Patient complains of tearfulness, sadness, anxiety since the death of his father today.  Patient's father has been battling cancer for many months.    Anxiety  Symptoms include chest pain, decreased concentration, nervous/anxious behavior and palpitations. Patient reports no confusion, dizziness, shortness of breath or suicidal ideas.           Review of Systems   Review of  "Systems   Constitutional:  Negative for fever.   Respiratory:  Negative for cough, shortness of breath, wheezing and stridor.    Cardiovascular:  Positive for chest pain and palpitations. Negative for leg swelling.   Neurological:  Negative for dizziness, syncope and headaches.   Psychiatric/Behavioral:  Positive for agitation and decreased concentration. Negative for behavioral problems, confusion, dysphoric mood, hallucinations, self-injury, sleep disturbance and suicidal ideas. The patient is nervous/anxious.          Current Medications       Current Outpatient Medications:     ALPRAZolam (XANAX) 0.5 mg tablet, Take 1 tablet (0.5 mg total) by mouth 2 (two) times a day as needed for anxiety, Disp: 10 tablet, Rfl: 0    predniSONE 10 mg tablet, Take once daily all day's pills on this schedule  6- 5- 4- 3- 2- 1 (Patient not taking: Reported on 1/22/2025), Disp: 21 tablet, Rfl: 0    Current Allergies     Allergies as of 01/22/2025    (No Known Allergies)            The following portions of the patient's history were reviewed and updated as appropriate: allergies, current medications, past family history, past medical history, past social history, past surgical history and problem list.     History reviewed. No pertinent past medical history.    Past Surgical History:   Procedure Laterality Date    DENTAL IMPLANT         Family History   Problem Relation Age of Onset    No Known Problems Mother     Cancer Father         thyroid, prostate, bladder         Medications have been verified.        Objective   /88   Pulse 78   Temp (!) 96.3 °F (35.7 °C)   Resp 18   Ht 5' 6\" (1.676 m)   Wt 83.9 kg (185 lb)   SpO2 98%   BMI 29.86 kg/m²        Physical Exam     Physical Exam  Constitutional:       General: He is not in acute distress.     Appearance: Normal appearance. He is not ill-appearing, toxic-appearing or diaphoretic.   HENT:      Head: Normocephalic and atraumatic.   Eyes:      Extraocular Movements: " Extraocular movements intact.      Conjunctiva/sclera: Conjunctivae normal.      Pupils: Pupils are equal, round, and reactive to light.   Cardiovascular:      Rate and Rhythm: Normal rate and regular rhythm.      Heart sounds: No murmur heard.     No friction rub. No gallop.   Pulmonary:      Effort: Pulmonary effort is normal.      Breath sounds: Normal breath sounds.   Skin:     Findings: No rash.   Neurological:      General: No focal deficit present.      Mental Status: He is alert.   Psychiatric:         Behavior: Behavior normal.         Thought Content: Thought content normal.         Judgment: Judgment normal.      Comments: Mood anxious and depressed

## 2025-05-15 ENCOUNTER — APPOINTMENT (OUTPATIENT)
Age: 40
End: 2025-05-15